# Patient Record
Sex: MALE | Race: WHITE | NOT HISPANIC OR LATINO | URBAN - METROPOLITAN AREA
[De-identification: names, ages, dates, MRNs, and addresses within clinical notes are randomized per-mention and may not be internally consistent; named-entity substitution may affect disease eponyms.]

---

## 2024-03-18 ENCOUNTER — OFFICE VISIT (OUTPATIENT)
Dept: SURGERY | Facility: CLINIC | Age: 24
End: 2024-03-18
Payer: COMMERCIAL

## 2024-03-18 DIAGNOSIS — L72.9 SCALP CYST: Primary | ICD-10-CM

## 2024-03-18 PROCEDURE — 1036F TOBACCO NON-USER: CPT | Performed by: SURGERY

## 2024-03-18 PROCEDURE — 99202 OFFICE O/P NEW SF 15 MIN: CPT | Performed by: SURGERY

## 2024-03-18 NOTE — PROGRESS NOTES
Subjective   Patient ID: Juan Alberto Jurado is a 23 y.o. male who presents for New Patient Visit and Cyst.    HPI the patient developed scalp cyst over the years  Review of Systems consistent with discomfort in the area of palpable scalp cyst, otherwise review of 14 system is negative   Physical Exampalpable 2 separate scalp cyst, 1 on the posterior midline 1.2 cm small and anteriorly 0.5 cm.  Otherwise physical exam is unremarkable    Objective I reviewed all available data including lab results, radiological studies, previous reports and notes.    No diagnosis found.   There is no problem list on file for this patient.     No Known Allergies   Medication Documentation Review Audit       Reviewed by Sarah Araiza MA (Medical Assistant) on 03/18/24 at 0740      Medication Order Taking? Sig Documenting Provider Last Dose Status            No Medications to Display                                   History reviewed. No pertinent past medical history.  Social History     Tobacco Use   Smoking Status Never   Smokeless Tobacco Never     No family history on file.   History reviewed. No pertinent surgical history.    Assessment/Plan   Scalp cysts.  The patient has indication for excision of scalp cyst to prevent infection.  Risks, benefits, alternative treatment were explained to the patient.  All questions were answered.  Informed consent was obtained.      Bashir Farias MD